# Patient Record
Sex: MALE | Race: WHITE
[De-identification: names, ages, dates, MRNs, and addresses within clinical notes are randomized per-mention and may not be internally consistent; named-entity substitution may affect disease eponyms.]

---

## 2017-12-08 ENCOUNTER — HOSPITAL ENCOUNTER (EMERGENCY)
Dept: HOSPITAL 17 - NEPD | Age: 37
Discharge: HOME | End: 2017-12-08
Payer: SELF-PAY

## 2017-12-08 VITALS
TEMPERATURE: 99.1 F | SYSTOLIC BLOOD PRESSURE: 113 MMHG | RESPIRATION RATE: 20 BRPM | DIASTOLIC BLOOD PRESSURE: 70 MMHG | HEART RATE: 104 BPM | OXYGEN SATURATION: 98 %

## 2017-12-08 VITALS — WEIGHT: 195.11 LBS | HEIGHT: 74 IN | BODY MASS INDEX: 25.04 KG/M2

## 2017-12-08 VITALS — DIASTOLIC BLOOD PRESSURE: 70 MMHG | SYSTOLIC BLOOD PRESSURE: 130 MMHG
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
## 2017-12-08 DIAGNOSIS — F17.200: ICD-10-CM
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
## 2017-12-08 DIAGNOSIS — S23.9XXA: Primary | ICD-10-CM

## 2017-12-08 PROCEDURE — 99284 EMERGENCY DEPT VISIT MOD MDM: CPT

## 2017-12-08 PROCEDURE — 96372 THER/PROPH/DIAG INJ SC/IM: CPT

## 2017-12-08 PROCEDURE — 72072 X-RAY EXAM THORAC SPINE 3VWS: CPT

## 2017-12-08 NOTE — PD
HPI


Chief Complaint:  Back/ Neck Pain or Injury


Time Seen by Provider:  11:19


Travel History


International Travel<30 days:  No


Contact w/Intl Traveler<30days:  No


Traveled to known affect area:  No





History of Present Illness


HPI


36-year-old male presents to the emergency Department with complaint of mid 

back pain after stepping in a hole with his right leg and twisting his back 

today.  He arrived via EMS and they did apply a cervical collar.  The patient 

removed the cervical collar prior to my examination and said he doesn't have 

neck pain.  Denies paresthesias, loss of sensation, decreased range of motion, 

decreased strength to all extremities.  Denies fever, vomiting.  Denies IV drug 

use or cancer; reports history of IV drug use years ago and is adamant when 

risks explained.  Denies encopresis, incontinence, saddle in his chest.  

Reports some abrasions to his right leg from stepping in a hole.  Unknown 

tetanus status.  Declines tetanus update at this time.  Denies significant past 

medical history.  Has not taken any medications or drainage from his to 

alleviate symptoms.  Rates pain 8/10.  Describes the pain as a stabbing 

sensation.  Worse with movement.  Better at rest.  No known allergies.  Has no 

medical complaints.  No other modifying factors or associated signs and 

symptoms.





PFSH


Past Medical History


Blood Disorders:  No


Cancer:  No


Cardiovascular Problems:  No


Diabetes:  No


Diminished Hearing:  No


Endocrine:  No


Gastrointestinal Disorders:  No


Genitourinary:  No


Immune Disorder:  No


Implanted Vascular Access Dvce:  No


Medical other:  Yes (JAW FRACTURE )


Musculoskeletal:  Yes (CHRONIC BACK PAIN "BULGING DISCS")


Neurologic:  No


Psychiatric:  No


Reproductive:  No


Respiratory:  No


Immunizations Current:  No


Tetanus Vaccination:  < 5 Years


Influenza Vaccination:  No





Past Surgical History


Abdominal Surgery:  No


Cardiac Surgery:  No


Ear Surgery:  No


Eye Surgery:  No


Genitourinary Surgery:  No


Gynecologic Surgery:  No


Neurologic Surgery:  No


Oral Surgery:  Yes (R JAW FX,WIRED TOGETHER FOR 12 WEEKS.)


Thoracic Surgery:  No


Other Surgery:  Yes (I+D LAC FOR SPIDER BITE 2MTHS AGO,JAW WIRED SHUT 2006)





Social History


Alcohol Use:  No


Tobacco Use:  Yes (1/2 PPD)


Substance Use:  No





Allergies-Medications


(Allergen,Severity, Reaction):  


Coded Allergies:  


     No Known Allergies (Verified  Adverse Reaction, Unknown, 12/8/17)


Reported Meds & Prescriptions





Reported Meds & Active Scripts


Active


Robaxin (Methocarbamol) 500 Mg Tab 500 Mg PO QID PRN


Ibuprofen 800 Mg Tab 800 Mg PO Q6HR PRN








Review of Systems


Except as stated in HPI:  all other systems reviewed are Neg





Physical Exam


Narrative


GENERAL: Well-nourished, well-developed  male patient, in no acute 

distress; afebrile, nontoxic-appearing


SKIN: Warm and dry.  Abrasions noted to right lower leg.


HEAD: Atraumatic. Normocephalic. 


EYES: Pupils equal and round. No scleral icterus. No injection or drainage.


ENT: Mucosa pink and moist.  Airway patent.


NECK: Moving freely.  Trachea midline.  No midline tenderness on palpation of 

the cervical spine.  Active rotation of the neck greater than 45 to the right 

and left.


CARDIOVASCULAR: Regular rate.  


RESPIRATORY: No accessory muscle use.  


GASTROINTESTINAL: Flat.


MUSCULOSKELETAL:  Bilateral lower extremities supple and non-tense with 2+ 

pedal pulses and sensory intact; with full range of motion and 5/5 strength.  

Active dorsiflexion and extension of bilateral feet.  Bilateral straight leg 

raise is positive for mid back pain.  Ambulatory in room with guarded gait.  

Sitting up in bed at 90.  No obvious deformities. No clubbing.  No cyanosis.  

No edema. 


BACK:  Midline point tenderness on palpation of the thoracic spine; no midline 

tenderness on palpation of the lumbar spine.  Tenderness on palpation of the 

bilateral paraspinal musculature of the thoracic area.  No obvious deformities.


NEUROLOGICAL: Awake and alert.  Oriented 3.  No obvious cranial nerve 

deficits.  Motor grossly within normal limits. Normal speech.  Moves all 

extremities.  5/5 strength to all extremities.  Sensory intact.


PSYCHIATRIC: Appropriate mood and affect; insight and judgment normal.





Data


Data


Last Documented VS





Vital Signs








  Date Time  Temp Pulse Resp B/P (MAP) Pulse Ox O2 Delivery O2 Flow Rate FiO2


 


12/8/17 10:57 99.1 104 20 113/70 (84) 98 Room Air  








Orders





 Orders


Spine, Thoracic-Ap/Lat/Sw(3vw) (12/8/17 11:25)


Ketorolac Inj (Toradol Inj) (12/8/17 11:30)


Orphenadrine Inj (Norflex Inj) (12/8/17 11:30)








MDM


Medical Decision Making


Medical Screen Exam Complete:  Yes


Emergency Medical Condition:  Yes


Medical Record Reviewed:  Yes


Differential Diagnosis


Thoracic back strain, muscle spasms, muscle strain of back, back strain


Narrative Course


36-year-old male with thoracic back injury after stepping in a hole in his 

back.  Patient has midline tenderness on palpation of the thoracic spine.  He 

is ambulatory in the room with a guarded gait.  Denies current IV drug use or 

cancer.  Denies encopresis, incontinence, saddle anesthesias.  Denies fevers, 

vomiting.  Patient declines tetanus update.  Thoracic spine x-rays ordered.  

Toradol and Norflex ordered.


1226:  Thoracic spine x-ray concludes:  Unremarkable examination of the 

thoracic spine.  Discussed findings with the patient.  Robaxin and ibuprofen 

prescribed for home.  Instructed patient to follow up with primary care 

provider.  Patient verbalizes understanding and agreement with treatment plan.  

Patient is medically cleared and stable for discharge.  Discussed reasons to 

return to the emergency department.  Patient agrees with treatment plan.  The 

patients vital signs are stable and the patient is stable for outpatient follow-

up and treatment.  Patient discharged home, stable and in no acute distress.





Diagnosis





 Primary Impression:  


 Thoracic back sprain


 Qualified Codes:  S23.9XXA - Sprain of unspecified parts of thorax, initial 

encounter


Referrals:  


[facility]





Primary Care Physician


Patient Instructions:  General Instructions, Thoracic Back Strain (ED)


Departure Forms:  Tests/Procedures, Work Release   Enter return to work date:  

Dec 11, 2017





***Additional Instructions:  


Tylenol or ibuprofen as directed and as needed for pain


Robaxin as prescribed and as needed for muscle spasms


Heating pad and/or ice to affected area to reduce pain


Avoid aggravating activities; increase activity as tolerated


Follow-up with primary care provider


Return to emergency department immediately with worsening of symptoms


***Med/Other Pt SpecificInfo:  Prescription(s) given


Scripts


Methocarbamol (Robaxin) 500 Mg Tab


500 MG PO QID Y for MUSCLE SPASM, #30 TAB 0 Refills


   Prov: Sally Jasso         12/8/17 


Ibuprofen (Ibuprofen) 800 Mg Tab


800 MG PO Q6HR Y for PAIN, #30 TAB 0 Refills


   Prov: Sally Jasso         12/8/17


Disposition:  01 DISCHARGE HOME


Condition:  Stable











Sally Jasso Dec 8, 2017 11:35

## 2017-12-08 NOTE — RADRPT
EXAM DATE/TIME:  12/08/2017 12:04 

 

[facility] COMPARISON:     

No previous studies available for comparison.

 

                     

INDICATIONS :     

Back pain, fall.

                     

 

MEDICAL HISTORY :     

None.          

 

SURGICAL HISTORY :     

None.   

 

ENCOUNTER:     

Initial                                        

 

ACUITY:     

1 day      

 

PAIN SCORE:     

8/10

 

LOCATION:      

middle back

 

FINDINGS:     

There is normal alignment of the thoracic vertebral bodies.  Vertebral body height is maintained.  No
 evidence of fracture or subluxation.  Pedicles are intact at all levels.  The paravertebral reflecti
ons are not thickened. 

 

CONCLUSION:     

Unremarkable examination of the thoracic spine.  

 

 

 

 Prashant Cabrera MD on December 08, 2017 at 12:16           

Board Certified Radiologist.

 This report was verified electronically.

## 2018-01-22 ENCOUNTER — HOSPITAL ENCOUNTER (EMERGENCY)
Dept: HOSPITAL 17 - NEPE | Age: 38
LOS: 1 days | Discharge: HOME | End: 2018-01-23
Payer: SELF-PAY

## 2018-01-22 DIAGNOSIS — R11.10: ICD-10-CM

## 2018-01-22 DIAGNOSIS — F17.200: ICD-10-CM

## 2018-01-22 DIAGNOSIS — T40.1X1A: Primary | ICD-10-CM

## 2018-01-22 PROCEDURE — 99283 EMERGENCY DEPT VISIT LOW MDM: CPT

## 2018-01-22 PROCEDURE — 93005 ELECTROCARDIOGRAM TRACING: CPT

## 2018-01-22 RX ADMIN — ONDANSETRON 1 MG: 4 TABLET, ORALLY DISINTEGRATING ORAL at 21:11
